# Patient Record
Sex: MALE | Race: ASIAN | NOT HISPANIC OR LATINO
[De-identification: names, ages, dates, MRNs, and addresses within clinical notes are randomized per-mention and may not be internally consistent; named-entity substitution may affect disease eponyms.]

---
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
## 2023-03-28 ENCOUNTER — APPOINTMENT (OUTPATIENT)
Dept: INTERNAL MEDICINE | Facility: CLINIC | Age: 18
End: 2023-03-28
Payer: COMMERCIAL

## 2023-03-28 VITALS
DIASTOLIC BLOOD PRESSURE: 72 MMHG | TEMPERATURE: 97.3 F | HEIGHT: 67 IN | RESPIRATION RATE: 14 BRPM | SYSTOLIC BLOOD PRESSURE: 118 MMHG | OXYGEN SATURATION: 99 % | WEIGHT: 130 LBS | BODY MASS INDEX: 20.4 KG/M2 | HEART RATE: 74 BPM

## 2023-03-28 DIAGNOSIS — Z71.84 ENC FOR HEALTH COUNSELING RELATED TO TRAVEL: ICD-10-CM

## 2023-03-28 PROCEDURE — 99214 OFFICE O/P EST MOD 30 MIN: CPT

## 2023-03-28 RX ORDER — ATOVAQUONE AND PROGUANIL HYDROCHLORIDE 250; 100 MG/1; MG/1
250-100 TABLET, FILM COATED ORAL DAILY
Qty: 30 | Refills: 0 | Status: ACTIVE | COMMUNITY
Start: 2023-03-28 | End: 1900-01-01

## 2023-03-28 RX ORDER — SALMONELLA TYPHI TY21A 6000000000 [CFU]/1
CAPSULE, COATED ORAL
Qty: 1 | Refills: 0 | Status: ACTIVE | COMMUNITY
Start: 2023-03-28 | End: 1900-01-01

## 2023-03-28 NOTE — HISTORY OF PRESENT ILLNESS
[de-identified] : 17M presents for travel advice. Is going to [place] of [facility] for 2 weeks. Denies fever, chills, cough, N/V, diarrhea.

## 2023-03-28 NOTE — HEALTH RISK ASSESSMENT
[0] : 2) Feeling down, depressed, or hopeless: Not at all (0) [PHQ-2 Negative - No further assessment needed] : PHQ-2 Negative - No further assessment needed [Never] : Never [[id number]] : 0

## 2023-03-28 NOTE — ASSESSMENT
[FreeTextEntry1] : Travel advice: Immunizations reviewed. He is up to date on hep A, hep B, tdap, polio. Discussed vivotif oral capsule, 1 capsule every other day until finished. Discussed malaria prophylaxis in [place] of [facility] with atovaquone-proguanil 250-100mg 1 tablet 1-2 days before he starts travel and until 7 days after he returns. \par